# Patient Record
Sex: FEMALE | Race: WHITE | NOT HISPANIC OR LATINO | URBAN - METROPOLITAN AREA
[De-identification: names, ages, dates, MRNs, and addresses within clinical notes are randomized per-mention and may not be internally consistent; named-entity substitution may affect disease eponyms.]

---

## 2017-02-08 ENCOUNTER — IMPORTED ENCOUNTER (OUTPATIENT)
Dept: URBAN - METROPOLITAN AREA CLINIC 31 | Facility: CLINIC | Age: 82
End: 2017-02-08

## 2017-02-08 PROBLEM — H01.002: Noted: 2017-02-08

## 2017-02-08 PROBLEM — H01.005: Noted: 2017-02-08

## 2017-02-08 PROBLEM — Z96.1: Noted: 2017-02-08

## 2017-02-08 PROBLEM — H43.813: Noted: 2017-02-08

## 2017-02-08 PROBLEM — H01.004: Noted: 2017-02-08

## 2017-02-08 PROBLEM — H01.001: Noted: 2017-02-08

## 2017-02-08 PROBLEM — E11.9: Noted: 2017-02-08

## 2017-02-08 PROCEDURE — 92015 DETERMINE REFRACTIVE STATE: CPT

## 2017-02-08 PROCEDURE — 92004 COMPRE OPH EXAM NEW PT 1/>: CPT

## 2017-02-08 NOTE — PATIENT DISCUSSION
1. DM II without sign of Diabetic Retinopathy OU:  Discussed the pathophysiology of diabetes and its effect on the eye. Stressed the importance of regular followup and good control of BS BP and Lipids to avoid future complications. 2. Pseudophakia OU - IOLs stable. open capsules ou. Monitor. 3. Blepharitis anterior type OU -   clean lids. The patient exhibits reddened crusty eyelid margins. Warm compresses and lid scrubs were recommended. May need Antibiotic silvia to lid margin qhs. Artificial Tears to be used as needed for discomfort. 4. PVD OU:  Patient was cautioned to call our office immediately if they experience a substantial change in their symptoms such as an increase in floaters persistent flashes loss of visual field (may appear as a shadow or a curtain) or decrease in visual acuity as these may indicate a retinal tear or detachment. If this is a new problem patient will need to return for re-examination  as determined by the [address] Rx change- needs rx for driving. 6. RTN 1 yr CE- Wants in Nov 2017.

## 2017-11-21 ENCOUNTER — IMPORTED ENCOUNTER (OUTPATIENT)
Dept: URBAN - METROPOLITAN AREA CLINIC 31 | Facility: CLINIC | Age: 82
End: 2017-11-21

## 2017-11-21 PROBLEM — H43.813: Noted: 2017-11-21

## 2017-11-21 PROBLEM — H01.004: Noted: 2017-11-21

## 2017-11-21 PROBLEM — Z96.1: Noted: 2017-11-21

## 2017-11-21 PROBLEM — E11.9: Noted: 2017-11-21

## 2017-11-21 PROBLEM — H01.001: Noted: 2017-11-21

## 2017-11-21 PROBLEM — H01.005: Noted: 2017-11-21

## 2017-11-21 PROBLEM — H01.002: Noted: 2017-11-21

## 2017-11-21 PROCEDURE — 92014 COMPRE OPH EXAM EST PT 1/>: CPT

## 2017-11-21 PROCEDURE — 92015 DETERMINE REFRACTIVE STATE: CPT

## 2017-11-21 NOTE — PATIENT DISCUSSION
1. DM II without sign of Diabetic Retinopathy OU:  Discussed the pathophysiology of diabetes and its effect on the eye. Stressed the importance of regular followup and good control of BS BP and Lipids to avoid future complications. 2. Pseudophakia OU - IOLs stable. open capsules ou. Monitor. 3. Blepharitis anterior type OU -   pt gets watering alot. clean lids. The patient exhibits reddened crusty eyelid margins. Warm compresses and lid scrubs were recommended. May need Antibiotic silvia to lid margin qhs. Artificial Tears to be used as needed for discomfort. 4. 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 Pt has a dist rx in her suns and uses to drive. Disc clear BF for pt in house. Pt will think about it. 6.  RTN 6 mths dFTA per pt req7.   RTN 1 yr CE

## 2018-05-21 ENCOUNTER — IMPORTED ENCOUNTER (OUTPATIENT)
Dept: URBAN - METROPOLITAN AREA CLINIC 31 | Facility: CLINIC | Age: 83
End: 2018-05-21

## 2018-05-21 PROBLEM — H01.005: Noted: 2018-05-21

## 2018-05-21 PROBLEM — H01.001: Noted: 2018-05-21

## 2018-05-21 PROBLEM — H01.002: Noted: 2018-05-21

## 2018-05-21 PROBLEM — H43.813: Noted: 2018-05-21

## 2018-05-21 PROBLEM — E11.9: Noted: 2018-05-21

## 2018-05-21 PROBLEM — H01.004: Noted: 2018-05-21

## 2018-05-21 PROBLEM — Z96.1: Noted: 2018-05-21

## 2018-05-21 PROCEDURE — 99214 OFFICE O/P EST MOD 30 MIN: CPT

## 2018-05-21 NOTE — PATIENT DISCUSSION
1. DM II ---NO BDR OU---  Discussed the pathophysiology of diabetes and its effect on the eye. Stressed the importance of regular followup and good control of BS BP and Lipids to avoid future complications. 2. Pseudophakia OU - IOLs stable. open capsules ou. Monitor. 3. Blepharitis anterior type OU -   pt gets watering alot. clean lids. The patient exhibits reddened crusty eyelid margins. Warm compresses and lid scrubs were recommended. May need Antibiotic silvia to lid margin qhs. Artificial Tears to be used as needed for discomfort. 4. 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 Pt has a dist rx in her suns and uses to drive. Disc clear dist rx but pt says she can still see TV. Pt will think about it. 6.  RTN 6 mths CE  --Pt likes to be seen every 6 mths. Had a fall 12/17 and having hard time walking.

## 2019-01-21 ENCOUNTER — IMPORTED ENCOUNTER (OUTPATIENT)
Dept: URBAN - METROPOLITAN AREA CLINIC 31 | Facility: CLINIC | Age: 84
End: 2019-01-21

## 2019-01-21 PROBLEM — H43.813: Noted: 2019-01-21

## 2019-01-21 PROBLEM — H01.004: Noted: 2019-01-21

## 2019-01-21 PROBLEM — H01.002: Noted: 2019-01-21

## 2019-01-21 PROBLEM — E11.9: Noted: 2019-01-21

## 2019-01-21 PROBLEM — H01.001: Noted: 2019-01-21

## 2019-01-21 PROBLEM — Z96.1: Noted: 2019-01-21

## 2019-01-21 PROBLEM — H01.005: Noted: 2019-01-21

## 2019-01-21 PROCEDURE — 92014 COMPRE OPH EXAM EST PT 1/>: CPT

## 2019-01-21 PROCEDURE — 92015 DETERMINE REFRACTIVE STATE: CPT

## 2019-01-21 PROCEDURE — 99080 SPECIAL REPORTS OR FORMS: CPT

## 2019-01-21 NOTE — PATIENT DISCUSSION
1. DM II ---NO BDR OU---  Discussed the pathophysiology of diabetes and its effect on the eye. Stressed the importance of regular followup and good control of BS BP and Lipids to avoid future complications. 2. Pseudophakia OU - IOLs stable. open capsules ou. Monitor. 3. Blepharitis anterior type OU -   pt gets watering alot. clean lids. The patient exhibits reddened crusty eyelid margins. Warm compresses and lid scrubs were recommended. May need Antibiotic silvia to lid margin qhs. Artificial Tears to be used as needed for discomfort. 4. 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 Pt has a dist rx in her suns and uses to drive. Needs to get a clear pr of glasses for dist--Anna does not want a BF or prog. May just get dist.  gave copy of rx. Filled DL form. 6.  RTN 6 mths DF  --Pt likes to be seen every 6 mths. Had a fall 12/17 and having hard time walking.

## 2022-04-02 ASSESSMENT — VISUAL ACUITY
OS_CC: 20/80+1
OS_CC: 20/70-2
OD_SC: 20/40-2
OD_CC: 20/100-2
OD_PH: SC 20/30 -2
OD_CC: 20/100+1
OS_CC: 20/40-2
OS_CC: 20/70-1
OD_CC: 20/70-2
OD_CC: 20/80-1
OS_PH: SC 20/40 -2
OS_SC: 20/50

## 2022-04-02 ASSESSMENT — TONOMETRY
OS_IOP_MMHG: 16
OS_IOP_MMHG: 16
OD_IOP_MMHG: 16
OS_IOP_MMHG: 14
OS_IOP_MMHG: 15
OD_IOP_MMHG: 14
OD_IOP_MMHG: 14
OD_IOP_MMHG: 16